# Patient Record
Sex: MALE | Race: WHITE | NOT HISPANIC OR LATINO
[De-identification: names, ages, dates, MRNs, and addresses within clinical notes are randomized per-mention and may not be internally consistent; named-entity substitution may affect disease eponyms.]

---

## 2020-11-06 PROBLEM — Z00.00 ENCOUNTER FOR PREVENTIVE HEALTH EXAMINATION: Status: ACTIVE | Noted: 2020-11-06

## 2020-11-13 ENCOUNTER — APPOINTMENT (OUTPATIENT)
Dept: UROLOGY | Facility: CLINIC | Age: 40
End: 2020-11-13
Payer: COMMERCIAL

## 2020-11-13 VITALS — SYSTOLIC BLOOD PRESSURE: 115 MMHG | TEMPERATURE: 98.3 F | HEART RATE: 72 BPM | DIASTOLIC BLOOD PRESSURE: 75 MMHG

## 2020-11-13 PROCEDURE — 99204 OFFICE O/P NEW MOD 45 MIN: CPT

## 2020-11-13 PROCEDURE — 99072 ADDL SUPL MATRL&STAF TM PHE: CPT

## 2020-11-13 NOTE — ASSESSMENT
[FreeTextEntry1] : hypogonadism\par azoospermia\par small bialteral varicocele\par scrotal sonogram\par hrmonal eval\par f/u 2-3 weeks\par

## 2020-11-13 NOTE — PHYSICAL EXAM
[General Appearance - Well Developed] : well developed [General Appearance - Well Nourished] : well nourished [Normal Appearance] : normal appearance [Well Groomed] : well groomed [Heart Rate And Rhythm] : Heart rate and rhythm were normal [] : no respiratory distress [Abdomen Soft] : soft [Abdomen Tenderness] : non-tender [Abdomen Hernia] : no hernia was discovered [Costovertebral Angle Tenderness] : no ~M costovertebral angle tenderness [Urethral Meatus] : meatus normal [Penis Abnormality] : normal circumcised penis [Urinary Bladder Findings] : the bladder was normal on palpation [Scrotum] : the scrotum was normal [Epididymis] : the epididymides were normal [Testes Tenderness] : no tenderness of the testes [Testes Mass (___cm)] : there were no testicular masses [FreeTextEntry1] : grade I bilateral varicocele. 10cc soft testes [Normal Station and Gait] : the gait and station were normal for the patient's age [Skin Color & Pigmentation] : normal skin color and pigmentation [No Focal Deficits] : no focal deficits [Oriented To Time, Place, And Person] : oriented to person, place, and time [No Palpable Adenopathy] : no palpable adenopathy

## 2020-11-13 NOTE — HISTORY OF PRESENT ILLNESS
[FreeTextEntry1] : 40M  to Deepa Mackey (35F)  for a number of years comes in with multiple year history of infertility. 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 recent extended search at [facility] no sperm seen. previousl seen by [facility]. was previously prescribed hormones. was considering microTESE. never proceeded with treatment. comes in now to discuss treatment. no penile pain. no ED. strong libido. no EjD. no family history prostate kdiney bladder cancer.

## 2020-11-16 LAB
ESTRADIOL SERPL-MCNC: 22 PG/ML
FSH SERPL-MCNC: 33.4 IU/L
LH SERPL-ACNC: 17 IU/L

## 2020-11-23 LAB
TESTOST BND SERPL-MCNC: 8.8 PG/ML
TESTOST SERPL-MCNC: 254.7 NG/DL

## 2020-12-09 ENCOUNTER — RESULT REVIEW (OUTPATIENT)
Age: 40
End: 2020-12-09

## 2020-12-09 ENCOUNTER — APPOINTMENT (OUTPATIENT)
Dept: ULTRASOUND IMAGING | Facility: HOSPITAL | Age: 40
End: 2020-12-09
Payer: COMMERCIAL

## 2020-12-09 ENCOUNTER — OUTPATIENT (OUTPATIENT)
Dept: OUTPATIENT SERVICES | Facility: HOSPITAL | Age: 40
LOS: 1 days | End: 2020-12-09
Payer: COMMERCIAL

## 2020-12-09 PROCEDURE — 93975 VASCULAR STUDY: CPT | Mod: 26

## 2020-12-09 PROCEDURE — 76870 US EXAM SCROTUM: CPT

## 2020-12-09 PROCEDURE — 93975 VASCULAR STUDY: CPT

## 2020-12-09 PROCEDURE — 76870 US EXAM SCROTUM: CPT | Mod: 26

## 2020-12-15 ENCOUNTER — APPOINTMENT (OUTPATIENT)
Dept: UROLOGY | Facility: CLINIC | Age: 40
End: 2020-12-15
Payer: COMMERCIAL

## 2020-12-15 VITALS
DIASTOLIC BLOOD PRESSURE: 76 MMHG | BODY MASS INDEX: 21.82 KG/M2 | TEMPERATURE: 98 F | HEART RATE: 76 BPM | OXYGEN SATURATION: 98 % | HEIGHT: 74 IN | SYSTOLIC BLOOD PRESSURE: 130 MMHG | WEIGHT: 170 LBS

## 2020-12-15 DIAGNOSIS — E29.1 TESTICULAR HYPOFUNCTION: ICD-10-CM

## 2020-12-15 PROCEDURE — 99072 ADDL SUPL MATRL&STAF TM PHE: CPT

## 2020-12-15 PROCEDURE — 99214 OFFICE O/P EST MOD 30 MIN: CPT

## 2020-12-15 RX ORDER — CLOMIPHENE CITRATE 50 MG/1
50 TABLET ORAL
Qty: 15 | Refills: 3 | Status: ACTIVE | COMMUNITY
Start: 2020-11-23 | End: 1900-01-01

## 2020-12-15 NOTE — ASSESSMENT
[FreeTextEntry1] : hypoongadism\par small bialteral varicocele\par marginal benefit from varicocelecotmy reviewed\par understand 20% recovery of sperm to ejaculate after vx repair in this setting\par ?will this improve TESE performance if needed\par continue clomid 50qod\par labs today\par f/u 3 months with repeat SA if not proceeding to vx repair

## 2020-12-15 NOTE — HISTORY OF PRESENT ILLNESS
[FreeTextEntry1] : retruns for follow up\par \par FSH 33\par now on clomid 50 qod\par no breast tenderness\par feels well\par no repeat labs yet\par small bilateral varicocele - 2.9mm right varicocele. 3 mm left varicocele

## 2021-01-04 LAB
ESTRADIOL SERPL-MCNC: 83 PG/ML
FSH SERPL-MCNC: 52.7 IU/L
LH SERPL-ACNC: 27.5 IU/L
TESTOST BND SERPL-MCNC: 24.8 PG/ML
TESTOST SERPL-MCNC: 934.3 NG/DL